# Patient Record
Sex: FEMALE | Race: OTHER | HISPANIC OR LATINO
[De-identification: names, ages, dates, MRNs, and addresses within clinical notes are randomized per-mention and may not be internally consistent; named-entity substitution may affect disease eponyms.]

---

## 2021-02-13 DIAGNOSIS — Z01.818 ENCOUNTER FOR OTHER PREPROCEDURAL EXAMINATION: ICD-10-CM

## 2021-02-13 PROBLEM — Z00.00 ENCOUNTER FOR PREVENTIVE HEALTH EXAMINATION: Status: ACTIVE | Noted: 2021-02-13

## 2021-02-15 ENCOUNTER — APPOINTMENT (OUTPATIENT)
Dept: DISASTER EMERGENCY | Facility: CLINIC | Age: 46
End: 2021-02-15

## 2021-02-16 LAB — SARS-COV-2 N GENE NPH QL NAA+PROBE: NOT DETECTED

## 2021-02-17 ENCOUNTER — TRANSCRIPTION ENCOUNTER (OUTPATIENT)
Age: 46
End: 2021-02-17

## 2021-02-17 VITALS
HEART RATE: 71 BPM | TEMPERATURE: 98 F | RESPIRATION RATE: 16 BRPM | OXYGEN SATURATION: 98 % | HEIGHT: 62 IN | WEIGHT: 207.45 LBS | SYSTOLIC BLOOD PRESSURE: 125 MMHG | DIASTOLIC BLOOD PRESSURE: 81 MMHG

## 2021-02-18 ENCOUNTER — TRANSCRIPTION ENCOUNTER (OUTPATIENT)
Age: 46
End: 2021-02-18

## 2021-02-18 ENCOUNTER — RESULT REVIEW (OUTPATIENT)
Age: 46
End: 2021-02-18

## 2021-02-18 ENCOUNTER — INPATIENT (INPATIENT)
Facility: HOSPITAL | Age: 46
LOS: 0 days | Discharge: HOME | DRG: 355 | End: 2021-02-19
Attending: SURGERY | Admitting: SURGERY
Payer: COMMERCIAL

## 2021-02-18 DIAGNOSIS — Z98.890 OTHER SPECIFIED POSTPROCEDURAL STATES: Chronic | ICD-10-CM

## 2021-02-18 LAB
BLD GP AB SCN SERPL QL: NEGATIVE — SIGNIFICANT CHANGE UP
RH IG SCN BLD-IMP: POSITIVE — SIGNIFICANT CHANGE UP

## 2021-02-18 PROCEDURE — 88302 TISSUE EXAM BY PATHOLOGIST: CPT | Mod: 26

## 2021-02-18 RX ORDER — ACETAMINOPHEN 500 MG
325 TABLET ORAL EVERY 4 HOURS
Refills: 0 | Status: DISCONTINUED | OUTPATIENT
Start: 2021-02-18 | End: 2021-02-19

## 2021-02-18 RX ORDER — SODIUM CHLORIDE 9 MG/ML
1000 INJECTION, SOLUTION INTRAVENOUS
Refills: 0 | Status: DISCONTINUED | OUTPATIENT
Start: 2021-02-18 | End: 2021-02-18

## 2021-02-18 RX ORDER — LEVOTHYROXINE SODIUM 125 MCG
1 TABLET ORAL
Qty: 0 | Refills: 0 | DISCHARGE

## 2021-02-18 RX ORDER — LEVOTHYROXINE SODIUM 125 MCG
50 TABLET ORAL DAILY
Refills: 0 | Status: DISCONTINUED | OUTPATIENT
Start: 2021-02-19 | End: 2021-02-19

## 2021-02-18 RX ORDER — HYDROMORPHONE HYDROCHLORIDE 2 MG/ML
1 INJECTION INTRAMUSCULAR; INTRAVENOUS; SUBCUTANEOUS ONCE
Refills: 0 | Status: DISCONTINUED | OUTPATIENT
Start: 2021-02-18 | End: 2021-02-18

## 2021-02-18 RX ORDER — OXYCODONE HYDROCHLORIDE 5 MG/1
5 TABLET ORAL EVERY 6 HOURS
Refills: 0 | Status: DISCONTINUED | OUTPATIENT
Start: 2021-02-18 | End: 2021-02-19

## 2021-02-18 RX ORDER — BUPIVACAINE 13.3 MG/ML
20 INJECTION, SUSPENSION, LIPOSOMAL INFILTRATION ONCE
Refills: 0 | Status: DISCONTINUED | OUTPATIENT
Start: 2021-02-18 | End: 2021-02-19

## 2021-02-18 RX ADMIN — HYDROMORPHONE HYDROCHLORIDE 1 MILLIGRAM(S): 2 INJECTION INTRAMUSCULAR; INTRAVENOUS; SUBCUTANEOUS at 15:27

## 2021-02-18 RX ADMIN — Medication 325 MILLIGRAM(S): at 21:12

## 2021-02-18 NOTE — DISCHARGE NOTE PROVIDER - NSDCMRMEDTOKEN_GEN_ALL_CORE_FT
levothyroxine 50 mcg (0.05 mg) oral tablet: 1 tab(s) orally once a day   acetaminophen 325 mg oral tablet: 2 tab(s) orally every 6 hours, As Needed -Mild Pain (1 - 3), Moderate Pain (4 - 6) MDD:max 4000 mg  levothyroxine 50 mcg (0.05 mg) oral tablet: 1 tab(s) orally once a day  oxyCODONE 5 mg oral tablet: 1 tab(s) orally every 6 hours, As needed, Severe Pain (7 - 10) MDD:max 4 tabs a day

## 2021-02-18 NOTE — BRIEF OPERATIVE NOTE - NSICDXBRIEFPROCEDURE_GEN_ALL_CORE_FT
PROCEDURES:  Diagnostic laparoscopy 18-Feb-2021 13:57:32  Jocelynn Falk  Ventral hernia repair 18-Feb-2021 13:57:09 with ventral ST mesh, medium Jocelynn Falk

## 2021-02-18 NOTE — DISCHARGE NOTE PROVIDER - CARE PROVIDER_API CALL
Barby Ba (MD)  Surgery  [address] [place] [address], [address]  [place] [address]  Phone: [phone]  Established Patient  Follow Up Time: 2 weeks

## 2021-02-18 NOTE — DISCHARGE NOTE PROVIDER - INSTRUCTIONS
Please continue your regular diet as tolerated at home. If you are having trouble with a regular diet, please return to a soft diet. Be sure to drink plenty of fluids. It is important that you stay hydrated.  Please continue your regular diet as tolerated at home. If you are having trouble with a regular diet, please return to a soft diet. Be sure to drink plenty of fluids. It is important that you stay hydrated.     Stay Hydrated:  - Avoid sugary drinks and caffeine   - Drink plenty of water, Gatorade and other fluids low in sugar that has electrolytes. Add soup and broth to your diet.   - drink about 2000 ml or more a day, if you do not have other medical condition that requires fluid restriction.   - You should be voiding clear yellow urine more than 4 times a day.

## 2021-02-18 NOTE — PROGRESS NOTE ADULT - SUBJECTIVE AND OBJECTIVE BOX
POST-OPERATIVE NOTE    Procedure: diagnostic laparoscopy w/ ventral hernia repair with mesh (open):     Diagnosis/Indication: ventral hernia     Surgeon: Jesenia/    S: Patient reports feeling mild abdominal pain. Denies CP, SOB, MANCIA, calf tenderness. Pain controlled with medication.    O:  T(C): 36.2 (02-18-21 @ 13:43), Max: 36.2 (02-18-21 @ 13:43)  T(F): 97.1 (02-18-21 @ 13:43), Max: 97.1 (02-18-21 @ 13:43)  HR: 70 (02-18-21 @ 15:13) (65 - 82)  BP: 114/60 (02-18-21 @ 15:13) (107/57 - 128/58)  RR: 17 (02-18-21 @ 15:13) (14 - 18)  SpO2: 99% (02-18-21 @ 15:13) (98% - 99%)  Wt(kg): --            Gen: resting comfortably in bed, alert but in mild pain  C/V: Normal sinus rhythm per monitor, HR:    , BP:   Pulm: Nonlabored breathing, no respiratory distress, no crepitation.   Abd: soft, nondistended, appropriately tender, incisions CDI without hematoma. No Drains  Urinary: No decker   Extrem: WWP, no calf edema, SCDs in place       POST-OPERATIVE NOTE    Procedure: diagnostic laparoscopy w/ ventral hernia repair with mesh (open)    Diagnosis/Indication: ventral hernia     Surgeon: Jesenia/    S: Patient reports feeling mild abdominal pain. Denies CP, SOB, MANCIA, calf tenderness. Pain controlled with medication.    O:  T(C): 36.2 (02-18-21 @ 13:43), Max: 36.2 (02-18-21 @ 13:43)  T(F): 97.1 (02-18-21 @ 13:43), Max: 97.1 (02-18-21 @ 13:43)  HR: 70 (02-18-21 @ 15:13) (65 - 82)  BP: 114/60 (02-18-21 @ 15:13) (107/57 - 128/58)  RR: 17 (02-18-21 @ 15:13) (14 - 18)  SpO2: 99% (02-18-21 @ 15:13) (98% - 99%)  Wt(kg): --            Gen: resting comfortably in bed, alert but in mild pain  C/V: Normal sinus rhythm per monitor, HR:    , BP:   Pulm: Nonlabored breathing, no respiratory distress, no crepitation.   Abd: soft, nondistended, appropriately tender, incisions CDI without hematoma. No Drains  Urinary: No decker   Extrem: WWP, no calf edema, SCDs in place

## 2021-02-18 NOTE — DISCHARGE NOTE PROVIDER - NSDCFUADDINST_GEN_ALL_CORE_FT
D/C instructions:  May take off gauze and clear tape on 2/19. May wear abdominal binder as comfortable. Can shower 2/19. No heavy weight lifting or core exercises. No tub baths or swimming. Return to see Dr. Ba in clinic in 1-2 weeks. No diet restrictions.    General Discharge Instructions:  Please resume all regular home medications unless specifically advised not to take a particular medication. Also, please take any new medications as prescribed.  Please get plenty of rest, continue to ambulate several times per day, and drink adequate amounts of fluids. Avoid lifting weights greater than 5-10 lbs until you follow-up with your surgeon, who will instruct you further regarding activity restrictions.  Avoid driving or operating heavy machinery while taking pain medications.  Please follow-up with your surgeon and Primary Care Provider (PCP) as advised.  Incision Care:  *Please call your doctor or nurse practitioner if you have increased pain, swelling, redness, or drainage from the incision site.  *Avoid swimming and baths until your follow-up appointment.  *You may shower, and wash surgical incisions with a mild soap and warm water. Gently pat the area dry.    Warning Signs:  Please call your doctor or nurse practitioner if you experience the following:  *You experience new chest pain, pressure, squeezing or tightness.  *New or worsening cough, shortness of breath, or wheeze.  *If you are vomiting and cannot keep down fluids or your medications.  *You are getting dehydrated due to continued vomiting, diarrhea, or other reasons. Signs of dehydration include dry mouth, rapid heartbeat, or feeling dizzy or faint when standing.  *You see blood or dark/black material when you vomit or have a bowel movement.  *You experience burning when you urinate, have blood in your urine, or experience a discharge.  *Your pain is not improving within 8-12 hours or is not gone within 24 hours. Call or return immediately if your pain is getting worse, changes location, or moves to your chest or back.  *You have shaking chills, or fever greater than 101.5 degrees Fahrenheit or 38 degrees Celsius.  *Any change in your symptoms, or any new symptoms that concern you.

## 2021-02-18 NOTE — DISCHARGE NOTE PROVIDER - NSDCCPCAREPLAN_GEN_ALL_CORE_FT
PRINCIPAL DISCHARGE DIAGNOSIS  Diagnosis: Hernia, ventral  Assessment and Plan of Treatment: 46 yo female with history of hypothyroidism and anemia and PSH right inguinal hernia repair noticed ventral hernia in 2009 after the birth of her 3rd child. It was small at the time, but enlarged over time. 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 She presents today for ventral hernia repair. 2/18: diagnostic laparoscopy w/ reduction of fat from hernia, ventral hernia repair with mesh (open). The procedure was uncomplicated and well tolerated by the patient. The post-operative course was uncomplicated. Diet was advanced as tolerated, and pain was well controlled on medication. On day of discharge, patient had stable vital signs, was tolerating diet, voiding without assistance, and pain was controlled. The patient is to follow up in 2 weeks.

## 2021-02-18 NOTE — DISCHARGE NOTE PROVIDER - HOSPITAL COURSE
44 yo female with history of hypothyroidism and anemia and PSH right inguinal hernia repair noticed ventral hernia in 2009 after the birth of her 3rd child. It was small at the time, but enlarged over time. 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 She presents today for ventral hernia repair. 2/18: diagnostic laparoscopy w/ reduction of fat from hernia, ventral hernia repair with mesh (open). The procedure was uncomplicated and well tolerated by the patient. The post-operative course was uncomplicated. Diet was advanced as tolerated, and pain was well controlled on medication. On day of discharge, patient had stable vital signs, was tolerating diet, voiding without assistance, and pain was controlled. The patient is to follow up in 2 weeks.

## 2021-02-18 NOTE — BRIEF OPERATIVE NOTE - OPERATION/FINDINGS
Patient prepped and draped, supine with left arm tucked. 12 mm AutoSuture port placed in LUQ using cut down technique. Under visualization, 5 mm port placed in LLQ. Large ventral hernia noted, with fat contents. Fat contents reduced laparoscopically. Next, incision was made around umbilicus of about 4 cm. Hernia sac was dissected from surrounding tissues, and ligated once dissection completed, with baseball stitch. Fascial edges around hernia dissected from surrounding tissue. Hernia size noted to be 2.5x4. Medium Ventral ST Mesh placed and secured in placed with [place] stitches. Site closed in layers with vicryl stitches, subdermal stitches, and skin closed with monocryl and dermabond. Left upper quadrant 12 mm port site fascia closed with vicryl stitches, and monocryl followed by dermabond. 5 mm port site closed with monocryl and dermabond. Pressure dressing placed, followed by abdominal binder.

## 2021-02-19 ENCOUNTER — TRANSCRIPTION ENCOUNTER (OUTPATIENT)
Age: 46
End: 2021-02-19

## 2021-02-19 VITALS
RESPIRATION RATE: 17 BRPM | TEMPERATURE: 98 F | SYSTOLIC BLOOD PRESSURE: 105 MMHG | HEART RATE: 72 BPM | OXYGEN SATURATION: 96 % | DIASTOLIC BLOOD PRESSURE: 69 MMHG

## 2021-02-19 PROCEDURE — 88302 TISSUE EXAM BY PATHOLOGIST: CPT

## 2021-02-19 PROCEDURE — 86850 RBC ANTIBODY SCREEN: CPT

## 2021-02-19 PROCEDURE — 86900 BLOOD TYPING SEROLOGIC ABO: CPT

## 2021-02-19 PROCEDURE — C1781: CPT

## 2021-02-19 PROCEDURE — 86901 BLOOD TYPING SEROLOGIC RH(D): CPT

## 2021-02-19 RX ORDER — OXYCODONE HYDROCHLORIDE 5 MG/1
1 TABLET ORAL
Qty: 8 | Refills: 0
Start: 2021-02-19 | End: 2021-02-20

## 2021-02-19 RX ORDER — ACETAMINOPHEN 500 MG
2 TABLET ORAL
Qty: 40 | Refills: 0
Start: 2021-02-19 | End: 2021-02-23

## 2021-02-19 RX ADMIN — OXYCODONE HYDROCHLORIDE 5 MILLIGRAM(S): 5 TABLET ORAL at 09:12

## 2021-02-19 RX ADMIN — Medication 50 MICROGRAM(S): at 05:09

## 2021-02-19 NOTE — DISCHARGE NOTE NURSING/CASE MANAGEMENT/SOCIAL WORK - PATIENT PORTAL LINK FT
You can access the FollowMyHealth Patient Portal offered by [facility] by registering at the following website: [email or web address]. By joining [employer]’s FollowMyHealth portal, you will also be able to view your health information using other applications (apps) compatible with our system.

## 2021-02-19 NOTE — PROGRESS NOTE ADULT - ASSESSMENT
46 yo female with history of hypothyroidism and anemia and PSH right inguinal hernia repair noticed ventral hernia in 2009 after the birth of her 3rd child. Presenting for elective procedure now s/p diagnostic laparoscopy w/ reduction of fat from hernia, and open ventral hernia repair with mesh (2/18)    Regular diet  Analgesics PRN  C/w home medications  Discharged home with FU appointment   
44 yo female with history of hypothyroidism and anemia and PSH right inguinal hernia repair noticed ventral hernia in 2009 after the birth of her 3rd child. It was small at the time, but enlarged over time. 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 She presents today for ventral hernia repair. 2/18: diagnostic laparoscopy w/ reduction of fat from hernia, ventral hernia repair with mesh (open)    23 hours Observation  Regular diet  ppx  SCD  Voiding   Given dilaudid 1x at PACU for pain      Discharge plan:  May take off gauze and clear tape on 2/19. May wear abdominal binder as comfortable. Can shower 2/19. No heavy weight lifting or core exercises. No tub baths or swimming. Return to see Dr. Ba in clinic in 1-2 weeks. No diet restrictions.

## 2021-02-19 NOTE — PROGRESS NOTE ADULT - SUBJECTIVE AND OBJECTIVE BOX
SUBJECTIVE: Patient seen and examined bedside by chief resident. Patient reports feeling fine, pain is much better controlled, had a good night, feels ready for hospital discharge. No concerns expressed.           Vital Signs Last 24 Hrs  T(C): 36.6 (19 Feb 2021 08:16), Max: 36.9 (19 Feb 2021 06:03)  T(F): 97.9 (19 Feb 2021 08:16), Max: 98.4 (19 Feb 2021 06:03)  HR: 67 (19 Feb 2021 08:16) (65 - 83)  BP: 111/72 (19 Feb 2021 08:16) (98/53 - 128/58)  BP(mean): 71 (18 Feb 2021 16:18) (71 - 85)  RR: 16 (19 Feb 2021 08:16) (14 - 25)  SpO2: 98% (19 Feb 2021 08:16) (95% - 99%)  I&O's Detail    18 Feb 2021 07:01  -  19 Feb 2021 07:00  --------------------------------------------------------  IN:    Lactated Ringers: 200 mL    Oral Fluid: 720 mL  Total IN: 920 mL    OUT:    Voided (mL): 950 mL  Total OUT: 950 mL    Total NET: -30 mL          PHYSICAL EXAMINATION   General: NAD  NEURO:  follows commands.   PULM: nonlabored breathing, no respiratory distress  ABD: soft, nondistended, appropriately tender, no rebound, no guarding, no tympany. Incisions CDI and without hematoma or erythema    EXTREM: WWP, no edema, no calf tenderness  VASC: No cyanosis,  no pallor.   PSYCH: Appropriate affect, answers questions appropriately      LABS:

## 2021-02-23 LAB — SURGICAL PATHOLOGY STUDY: SIGNIFICANT CHANGE UP

## 2021-02-24 DIAGNOSIS — D64.9 ANEMIA, UNSPECIFIED: ICD-10-CM

## 2021-02-24 DIAGNOSIS — E03.9 HYPOTHYROIDISM, UNSPECIFIED: ICD-10-CM

## 2021-02-24 DIAGNOSIS — K43.9 VENTRAL HERNIA WITHOUT OBSTRUCTION OR GANGRENE: ICD-10-CM

## 2021-02-24 DIAGNOSIS — E66.9 OBESITY, UNSPECIFIED: ICD-10-CM

## 2021-12-01 ENCOUNTER — HOSPITAL ENCOUNTER (EMERGENCY)
Age: 46
Discharge: HOME | End: 2021-12-01
Attending: EMERGENCY MEDICINE

## 2021-12-01 VITALS
DIASTOLIC BLOOD PRESSURE: 79 MMHG | HEART RATE: 84 BPM | TEMPERATURE: 98.1 F | OXYGEN SATURATION: 97 % | RESPIRATION RATE: 16 BRPM | SYSTOLIC BLOOD PRESSURE: 125 MMHG

## 2021-12-01 DIAGNOSIS — S01.511A LIP LACERATION, INITIAL ENCOUNTER: Primary | ICD-10-CM

## 2021-12-01 PROCEDURE — 12011 RPR F/E/E/N/L/M 2.5 CM/<: CPT | Performed by: EMERGENCY MEDICINE

## 2021-12-01 PROCEDURE — 99283 EMERGENCY DEPT VISIT LOW MDM: CPT

## 2021-12-01 PROCEDURE — 90471 IMMUNIZATION ADMIN: CPT | Performed by: EMERGENCY MEDICINE

## 2021-12-01 PROCEDURE — 10004651 HB RX, NO CHARGE ITEM: Performed by: EMERGENCY MEDICINE

## 2021-12-01 PROCEDURE — 90715 TDAP VACCINE 7 YRS/> IM: CPT | Performed by: EMERGENCY MEDICINE

## 2021-12-01 PROCEDURE — 10002800 HB RX 250 W HCPCS: Performed by: EMERGENCY MEDICINE

## 2021-12-01 PROCEDURE — 99284 EMERGENCY DEPT VISIT MOD MDM: CPT | Performed by: EMERGENCY MEDICINE

## 2021-12-01 RX ORDER — ACETAMINOPHEN 500 MG
1000 TABLET ORAL ONCE
Status: COMPLETED | OUTPATIENT
Start: 2021-12-01 | End: 2021-12-01

## 2021-12-01 RX ORDER — LIDOCAINE HYDROCHLORIDE 10 MG/ML
INJECTION, SOLUTION EPIDURAL; INFILTRATION; INTRACAUDAL; PERINEURAL
Status: DISCONTINUED
Start: 2021-12-01 | End: 2021-12-01 | Stop reason: HOSPADM

## 2021-12-01 RX ADMIN — ACETAMINOPHEN 1000 MG: 500 TABLET ORAL at 21:22

## 2021-12-01 RX ADMIN — TETANUS TOXOID, REDUCED DIPHTHERIA TOXOID AND ACELLULAR PERTUSSIS VACCINE, ADSORBED 0.5 ML: 5; 2.5; 8; 8; 2.5 SUSPENSION INTRAMUSCULAR at 21:22

## 2021-12-01 ASSESSMENT — ENCOUNTER SYMPTOMS
COUGH: 0
ADENOPATHY: 0
HEADACHES: 0
SHORTNESS OF BREATH: 0
DIARRHEA: 0
DIAPHORESIS: 0
ABDOMINAL PAIN: 0
CHILLS: 0
EYE PAIN: 0
SORE THROAT: 0
CONFUSION: 0
ABDOMINAL DISTENTION: 0
BACK PAIN: 0
VOMITING: 0
EYE DISCHARGE: 0
NAUSEA: 0
CHEST TIGHTNESS: 0
NUMBNESS: 0
WEAKNESS: 0
COLOR CHANGE: 0
BLOOD IN STOOL: 0
BRUISES/BLEEDS EASILY: 0
HALLUCINATIONS: 0
APPETITE CHANGE: 0
WOUND: 1
FEVER: 0

## 2021-12-01 ASSESSMENT — PAIN SCALES - GENERAL: PAINLEVEL_OUTOF10: 3

## 2022-06-29 NOTE — DISCHARGE NOTE NURSING/CASE MANAGEMENT/SOCIAL WORK - NSTOBACCONEVERSMOKERY/N_GEN_A
REceived My Risk genetic report , scanned to media  Pt receiving care elsewhere due to insurance coverage network, however I have faxed this report to Dr. Teddy Urena / Shashi Whitaker NP genetics at fax # [phone] as requested per this office. Pt to see DR Urena   Your fax has been successfully sent to [phone] at [phone].    
No

## 2025-06-04 NOTE — DISCHARGE NOTE PROVIDER - NSDCACTIVITY_GEN_ALL_CORE
Sex allowed/Showering allowed/Stairs allowed/Walking - Indoors allowed/No heavy lifting/straining/Walking - Outdoors allowed 71